# Patient Record
(demographics unavailable — no encounter records)

---

## 2024-10-17 NOTE — REASON FOR VISIT
[Symptom and Test Evaluation] : symptom and test evaluation [Arrhythmia/ECG Abnorrmalities] : arrhythmia/ECG abnormalities [Hyperlipidemia] : hyperlipidemia [Hypertension] : hypertension [Coronary Artery Disease] : coronary artery disease [Other: ____] : [unfilled]

## 2024-10-18 NOTE — CARDIOLOGY SUMMARY
[de-identified] : 10/18/24  atrial flutter with controlled rate   LVH poor R wave progression  non specific ST T changes  [de-identified] : 9/22/20 no ischemia ,  [de-identified] :  3/10/23    Trace pericardial effusion , EF 60-65%   Normal LV EF  normal RVEF normal function mild TR  normal RVSP  44 mm hg \par  Trace   MR ,TR RVSP less than before \par  \par    [de-identified] : 2/2/23  LM ,RCA mild diffuse disease , prox LAD 80%, prox LCX 90% ,   PCI LCX 2/2/23/ prox LAD on 2/6/23

## 2024-10-18 NOTE — HISTORY OF PRESENT ILLNESS
[FreeTextEntry1] : Patient  hx of DM, HTN  Fibromyalgia , obesity , chronic pain  morbid obesity   former smoker,COPD  weight gait  PAF, diastolic heart failure ,pericardial effusion which is improved after starting on hemodialysis ,cardiac cath showed 2 V disease , had PCI stent OF LAD , LCX , also had small punctate right occipital infarct came for pre op cardiac evaluation for cataract surgery , scheduled on ,oct 24      with   otherwise  says she is feeling fine ,  patient denies any chest pain or shortness of breathing ,   Patient blood pressure elevated dialysis due on today ,    she  on dialysis  monday wednesday fridays ,   patient blood work showed optimal lipids , HB a1c 6.6  Low TSH being followed by endocrinologist   Patient had hx of significant smoking  for 25 pack years quit 2022  again smoking , and marijuana ,  Patient does give hx of mild wheeze in morning time ,  Patient is continue to smoke despite of several recommendation ,      Patient does have hx of  GERD , controlled on Prilosec     last Hba1c 10.6  and

## 2024-10-18 NOTE — DISCUSSION/SUMMARY
[FreeTextEntry1] : Patient with above hx ,without active cardiac symptoms , who is stable and optimal for low risk cataract surgery ,  continue eliquis ,   chronic diastolic heart failure hypertensive heart disease ESRD on hemodialysis , resolved pericardial effusion , on hemodialysis   CAD Prox LAD Prox LCX stent 2/6/23 : continue antiplatelet drugs , statin , plavix 75 mg po daily  HTN Hypertensive heart disease disease : mild elevated today , will improve after todays dialysis , continue  low salt diet ,  continue coreg 6.25 mg po BID , home BP monitor  PAF , In afib with CVR , ontinue current medication ,eliquis  coreg  ( rate is controlled  off of cardizem for 1 month )  Poorly controlled DM , non compliance to diet , exercise , followed by endocrine , explained to patient repeatedly  Discussed Low salt, low carb diet, and life style modifications to be compliant to medication.    follow up after 4 months   [EKG obtained to assist in diagnosis and management of assessed problem(s)] : EKG obtained to assist in diagnosis and management of assessed problem(s)

## 2024-10-18 NOTE — PHYSICAL EXAM
[No Acute Distress] : no acute distress [Obese] : obese [Normal Conjunctiva] : normal conjunctiva [Normal Venous Pressure] : normal venous pressure [No Carotid Bruit] : no carotid bruit [Normal Rate] : normal [Normal S1] : normal S1 [Normal S2] : normal S2 [No Murmur] : no murmurs heard [___ +] : bilateral [unfilled]U+ pitting edema to the ankles [2+] : left 2+ [No Abnormalities] : the abdominal aorta was not enlarged and no bruit was heard [Clear Lung Fields] : clear lung fields [Good Air Entry] : good air entry [No Respiratory Distress] : no respiratory distress  [Jaycee ___] : jaycee DawnV [Soft] : abdomen soft [Non Tender] : non-tender [Normal Gait] : normal gait [Edema ___] : edema [unfilled] [S3] : no S3 [S4] : no S4 [Rt] : no varicose veins of the right leg [Lt] : no varicose veins of the left leg [Right Carotid Bruit] : no bruit heard over the right carotid [Left Carotid Bruit] : no bruit heard over the left carotid [Right Femoral Bruit] : no bruit heard over the right femoral artery [Left Femoral Bruit] : no bruit heard over the left femoral artery [No Rash] : no rash [Moves all extremities] : moves all extremities [de-identified] :  IR IR  [de-identified] : obese [de-identified] : AV fistula left arm

## 2024-12-05 NOTE — HISTORY OF PRESENT ILLNESS
[FreeTextEntry1] : Patient  hx of DM, HTN  Fibromyalgia , obesity , chronic pain  morbid obesity   former smoker,COPD  weight gait  PAF, diastolic heart failure ,pericardial effusion which is improved after starting on hemodialysis ,cardiac cath showed 2 V disease , had PCI stent OF LAD , LCX , also had small punctate right occipital infarct came for follow up says she is feeling fine , had cataract surgery ,    patient denies any chest pain or shortness of breathing ,   Patient blood pressure elevated dialysis due on today ,    she  on dialysis  monday wednesday fridays ,   patient blood work showed optimal lipids , HB a1c 6.6  Low TSH being followed by endocrinologist   Patient had hx of significant smoking  for 25 pack years quit 2022  again smoking , and marijuana ,  Patient does give hx of mild wheeze in morning time ,  Patient is continue to smoke despite of several recommendation ,      Patient does have hx of  GERD , controlled on Prilosec     last Hba1c 10.6  and

## 2024-12-05 NOTE — PHYSICAL EXAM
[No Acute Distress] : no acute distress [Obese] : obese [Normal Conjunctiva] : normal conjunctiva [Normal Venous Pressure] : normal venous pressure [No Carotid Bruit] : no carotid bruit [Normal Rate] : normal [Normal S1] : normal S1 [Normal S2] : normal S2 [S3] : no S3 [S4] : no S4 [No Murmur] : no murmurs heard [___ +] : bilateral [unfilled]U+ pitting edema to the ankles [Rt] : no varicose veins of the right leg [Lt] : no varicose veins of the left leg [Right Carotid Bruit] : no bruit heard over the right carotid [Left Carotid Bruit] : no bruit heard over the left carotid [Right Femoral Bruit] : no bruit heard over the right femoral artery [Left Femoral Bruit] : no bruit heard over the left femoral artery [2+] : left 2+ [No Abnormalities] : the abdominal aorta was not enlarged and no bruit was heard [Clear Lung Fields] : clear lung fields [Good Air Entry] : good air entry [No Respiratory Distress] : no respiratory distress  [Jaycee ___] : jaycee DawnV [Soft] : abdomen soft [Non Tender] : non-tender [Normal Gait] : normal gait [Edema ___] : edema [unfilled] [No Rash] : no rash [Moves all extremities] : moves all extremities [de-identified] :  IR IR  [de-identified] : obese [de-identified] : AV fistula left arm

## 2024-12-05 NOTE — REVIEW OF SYSTEMS
[Dyspnea on exertion] : dyspnea during exertion [Chest Discomfort] : no chest discomfort [Lower Ext Edema] : no extremity edema [Orthopnea] : no orthopnea [Negative] : Heme/Lymph

## 2024-12-05 NOTE — CARDIOLOGY SUMMARY
[de-identified] : 12/5/24 atrial flutter with controlled rate   LVH poor R wave progression  non specific ST T changes  [de-identified] : 9/22/20 no ischemia ,  [de-identified] :  3/10/23    Trace pericardial effusion , EF 60-65%   Normal LV EF  normal RVEF normal function mild TR  normal RVSP  44 mm hg \par  Trace   MR ,TR RVSP less than before \par  \par    [de-identified] : 2/2/23  LM ,RCA mild diffuse disease , prox LAD 80%, prox LCX 90% ,   PCI LCX 2/2/23/ prox LAD on 2/6/23

## 2024-12-05 NOTE — DISCUSSION/SUMMARY
[FreeTextEntry1] :   chronic diastolic heart failure hypertensive heart disease ESRD on hemodialysis , resolved pericardial effusion , on hemodialysis   CAD Prox LAD Prox LCX stent 2/6/23 : continue antiplatelet drugs , statin , plavix 75 mg po daily  HTN Hypertensive heart disease disease : mild elevated today , will improve after todays dialysis , continue  low salt diet ,  continue coreg 6.25 mg po BID , home BP monitor  PAF , In afib with CVR , ontinue current medication ,eliquis  coreg  ( rate is controlled  off of cardizem for 1 month )  s/p pericardial effusion , improved on dialysis : will obtain follow up echo   Poorly controlled DM , non compliance to diet , exercise , followed by endocrine , explained to patient repeatedly  Discussed Low salt, low carb diet, and life style modifications to be compliant to medication.    follow up after  months   [EKG obtained to assist in diagnosis and management of assessed problem(s)] : EKG obtained to assist in diagnosis and management of assessed problem(s)

## 2025-03-25 NOTE — PHYSICAL EXAM
[No Acute Distress] : no acute distress [Obese] : obese [Normal Conjunctiva] : normal conjunctiva [Normal Venous Pressure] : normal venous pressure [No Carotid Bruit] : no carotid bruit [Normal Rate] : normal [Normal S1] : normal S1 [Normal S2] : normal S2 [No Murmur] : no murmurs heard [___ +] : bilateral [unfilled]U+ pitting edema to the ankles [2+] : left 2+ [No Abnormalities] : the abdominal aorta was not enlarged and no bruit was heard [Clear Lung Fields] : clear lung fields [Good Air Entry] : good air entry [No Respiratory Distress] : no respiratory distress  [Jaycee ___] : jaycee DawnV [Soft] : abdomen soft [Non Tender] : non-tender [Normal Gait] : normal gait [Edema ___] : edema [unfilled] [No Rash] : no rash [Moves all extremities] : moves all extremities [S3] : no S3 [S4] : no S4 [Rt] : no varicose veins of the right leg [Lt] : no varicose veins of the left leg [Right Carotid Bruit] : no bruit heard over the right carotid [Left Carotid Bruit] : no bruit heard over the left carotid [Right Femoral Bruit] : no bruit heard over the right femoral artery [Left Femoral Bruit] : no bruit heard over the left femoral artery [de-identified] :  IR IR  [de-identified] : obese [de-identified] : AV fistula left arm

## 2025-03-25 NOTE — CARDIOLOGY SUMMARY
[de-identified] : 3/25/25  atrial flutter with controlled rate   LVH poor R wave progression  non specific ST T changes  [de-identified] : 9/22/20 no ischemia ,  [de-identified] :  3/20/25 50-55%EF mild LVH  regional walltion abnormalities,  Mild to moderate MR ,mild TR     [de-identified] : 2/2/23  LM ,RCA mild diffuse disease , prox LAD 80%, prox LCX 90% ,   PCI LCX 2/2/23/ prox LAD on 2/6/23

## 2025-03-25 NOTE — HISTORY OF PRESENT ILLNESS
[FreeTextEntry1] : general followup [de-identified] : ESRD: only on 2 days a week HD M F   Weight stable MJ

## 2025-03-25 NOTE — PHYSICAL EXAM
[No Acute Distress] : no acute distress [Obese] : obese [Normal Conjunctiva] : normal conjunctiva [Normal Venous Pressure] : normal venous pressure [No Carotid Bruit] : no carotid bruit [Normal Rate] : normal [Normal S1] : normal S1 [Normal S2] : normal S2 [No Murmur] : no murmurs heard [___ +] : bilateral [unfilled]U+ pitting edema to the ankles [2+] : left 2+ [No Abnormalities] : the abdominal aorta was not enlarged and no bruit was heard [Clear Lung Fields] : clear lung fields [Good Air Entry] : good air entry [No Respiratory Distress] : no respiratory distress  [Jaycee ___] : jaycee DawnV [Soft] : abdomen soft [Non Tender] : non-tender [Normal Gait] : normal gait [Edema ___] : edema [unfilled] [No Rash] : no rash [Moves all extremities] : moves all extremities [S3] : no S3 [S4] : no S4 [Rt] : no varicose veins of the right leg [Lt] : no varicose veins of the left leg [Right Carotid Bruit] : no bruit heard over the right carotid [Left Carotid Bruit] : no bruit heard over the left carotid [Right Femoral Bruit] : no bruit heard over the right femoral artery [Left Femoral Bruit] : no bruit heard over the left femoral artery [de-identified] :  IR IR  [de-identified] : obese [de-identified] : AV fistula left arm

## 2025-03-25 NOTE — DISCUSSION/SUMMARY
[FreeTextEntry1] :  chronic diastolic heart failure hypertensive heart disease ESRD on hemodialysis , resolved pericardial effusion , on hemodialysis  had wall motion abnormalities   CAD Prox LAD Prox LCX stent 2/6/23 : continue antiplatelet drugs , statin , plavix 75 mg po daily; will obtain chemical stress test as patient EF decreased compared to prior echo   HTN Hypertensive heart disease disease : mild elevated today , will improve after todays dialysis , continue  low salt diet ,  continue coreg 6.25 mg po BID , home BP monitor  PAF , In afib with CVR , ontinue current medication ,eliquis  coreg  ( rate is controlled  off of cardizem for 1 month )  s/p pericardial effusion , improved on dialysis : will obtain follow up echo   Poorly controlled DM , non compliance to diet , exercise , followed by endocrine , explained to patient repeatedly  Discussed Low salt, low carb diet, and life style modifications to be compliant to medication.    follow up after  months   [EKG obtained to assist in diagnosis and management of assessed problem(s)] : EKG obtained to assist in diagnosis and management of assessed problem(s)

## 2025-03-25 NOTE — HISTORY OF PRESENT ILLNESS
[FreeTextEntry1] : Patient  hx of DM, HTN  Fibromyalgia , obesity , chronic pain  morbid obesity   former smoker,COPD  weight gait  PAF, diastolic heart failure ,pericardial effusion which is improved after starting on hemodialysis ,cardiac cath showed 2 V disease , had PCI stent OF LAD , LCX , also had small punctate right occipital infarct came for follow up says she is feeling fine , patient denies any chest pain or shortness of breathing, echo showed  borderline  EF 50-55% mild to moderate  MR mild TR   Patient blood pressure elevated dialysis due on today ,    she  on dialysis  monday wednesday fridays ,   patient blood work showed optimal lipids , HB a1c 6.6  Low TSH being followed by endocrinologist   Patient had hx of significant smoking  for 25 pack years quit 2022  again smoking , and marijuana ,  Patient does give hx of mild wheeze in morning time ,  Patient is continue to smoke despite of several recommendation ,      Patient does have hx of  GERD , controlled on Prilosec     last Hba1c 10.6  and

## 2025-03-25 NOTE — CARDIOLOGY SUMMARY
[de-identified] : 3/25/25  atrial flutter with controlled rate   LVH poor R wave progression  non specific ST T changes  [de-identified] : 9/22/20 no ischemia ,  [de-identified] :  3/20/25 50-55%EF mild LVH  regional walltion abnormalities,  Mild to moderate MR ,mild TR     [de-identified] : 2/2/23  LM ,RCA mild diffuse disease , prox LAD 80%, prox LCX 90% ,   PCI LCX 2/2/23/ prox LAD on 2/6/23

## 2025-05-15 NOTE — PHYSICAL EXAM
[Alert] : alert [No Acute Distress] : in no acute distress [Sclera] : the sclera and conjunctiva were normal [EOMI] : extraocular movements were intact [Normal Outer Ear/Nose] : the ears and nose were normal in appearance [Normal Appearance] : the appearance of the neck was normal [Normal] : no respiratory distress, no accessory muscle use, normal respiratory rhythm and effort, lungs were clear to auscultation bilaterally [Normal S1, S2] : normal S1 and S2 [Heart Rate And Rhythm] : heart rate was normal and rhythm regular [Edema] : edema was not present [Involuntary Movements] : no involuntary movements were seen [No Focal Deficits] : no focal deficits [Normal Affect] : the affect was normal [Normal Mood] : the mood was normal [de-identified] : healing scab on right upper back, nontender, no purulence [___ / 5] : Visuospatial / Executive: [unfilled] / 5 [0 / 0] : Memory: 0 / 0 [___ / 3] : Attention (Serial 7 subtraction): [unfilled] / 3 [___ / 1] : Fluency: [unfilled] / 1 [___ / 2] : Abstraction: [unfilled] / 2 [___ / 5] : Delayed Recall: [unfilled] / 5 [___ / 6] : Orientation: [unfilled] / 6 [MocaTotal] : 27 [FreeTextEntry1] : 5/15/2025

## 2025-05-15 NOTE — ASSESSMENT
[FreeTextEntry1] : subjective complaints of memory loss: hx cva with complaints of memory loss MOCA today score 27/30 consistent with normal range counselled on vascular disease smoking cessation- she already has NRT and plans to start cognitive stimulating activities  anxiety: c/w diazepam  counselled on potential risk that diazepam can worsen confusion  poor balance: discussed exercise for balance she declines PT

## 2025-05-15 NOTE — REVIEW OF SYSTEMS
[Cough] : cough [Skin Wound] : skin wound [As Noted in HPI] : as noted in HPI [Negative] : Heme/Lymph

## 2025-05-15 NOTE — HISTORY OF PRESENT ILLNESS
[FreeTextEntry1] : 65yoF with anxiety, ESRD on HD, htn, chf, dm2, gerd, hypothryoidsm,pafib, neuropathy, poor balance, cad, and anxiety who is seen as a new patient for consulation for cc of memory loss.   cva 2023: since then has had multiple falls due to poor balance goes to opthal recent cataract surgery DM2 with neuropathy  memory loss: noticed worse after the stroke misplacing items, forgetting appts: having harder time no driving problems  notices more irritable, but denies personality changes independent with ADL's and IADL"s sleeping okay   INTERPRETATION:  MR of the brain without gadolinium contrast  CLINICAL INFORMATION:  stroke. new dysarthria, sluggish speech post  coronary intervention yesterday  TECHNIQUE:   Sagittal and axial T1-weighted images, axial FLAIR images,  axial susceptibility weighted images, axial T2-weighted images and axial  diffusion weighted images of the brain were obtained.  COMPARISON:   CT head from earlier same date available for review.  FINDINGS:  BRAIN: Within cerebral cortex of the right occipital lobe there is a  punctate focus of acute infarction. This acute infarct demonstrates  diffusion restriction. This infarct is brightly hyperintense on diffusion  weighted images, intermediate hyperintense on the long TR images, low  signal intensity on ADC images and is largely inconspicuous on the  remaining pulse sequences.  The brain also demonstrates minimal periatrial and periventricular  hyperintensity on the long TR images to suggest ischemic white matter  disease.  No intracranial hemorrhage is recognized.  No mass effect is  found in the brain.  CSF SPACES:   The ventricles, sulci and basal cisterns appear  unremarkable.   Tiny subcortical vertex.  In spaces are noted.  VESSELS:   The vertebral and internal carotid arteries demonstrate  expected flow voids indicating their patency.  HEAD AND NECK STRUCTURES:   The orbits are unremarkable.  Paranasal  sinuses are clear.  The nasal cavity appears intact.  The central skull  base appears intact.  The nasopharynx is symmetric.  The temporal bones  appear clear of disease.  The calvarium appears unremarkable.   IMPRESSION:  Right occipital punctate acute infarction   anxiety: taking diazepam daily denies over sedation   [Two or more falls in past year] : Patient reported two or more falls in the past year

## 2025-05-21 NOTE — HISTORY OF PRESENT ILLNESS
[de-identified] : PROGRESSIVE HEARING LOSS LEFT X 2 MONTHS DIZZINESS X 2 WEEKS MEDICAL HX REVIEWED

## 2025-05-21 NOTE — ASSESSMENT
[FreeTextEntry1] :  mild hl vng fibromyalgia/ depression discussed pmd up to date exam VNG STARS F/U AFTER ABOVE